# Patient Record
Sex: FEMALE | Race: BLACK OR AFRICAN AMERICAN | NOT HISPANIC OR LATINO | URBAN - METROPOLITAN AREA
[De-identification: names, ages, dates, MRNs, and addresses within clinical notes are randomized per-mention and may not be internally consistent; named-entity substitution may affect disease eponyms.]

---

## 2018-10-03 ENCOUNTER — EMERGENCY (EMERGENCY)
Facility: HOSPITAL | Age: 23
LOS: 1 days | Discharge: HOME | End: 2018-10-03
Attending: EMERGENCY MEDICINE | Admitting: EMERGENCY MEDICINE
Payer: SELF-PAY

## 2018-10-03 VITALS
SYSTOLIC BLOOD PRESSURE: 109 MMHG | WEIGHT: 199.96 LBS | DIASTOLIC BLOOD PRESSURE: 65 MMHG | HEIGHT: 66 IN | TEMPERATURE: 98 F | HEART RATE: 77 BPM | RESPIRATION RATE: 16 BRPM | OXYGEN SATURATION: 99 %

## 2018-10-03 PROBLEM — Z00.00 ENCOUNTER FOR PREVENTIVE HEALTH EXAMINATION: Status: ACTIVE | Noted: 2018-10-03

## 2018-10-03 PROCEDURE — 99283 EMERGENCY DEPT VISIT LOW MDM: CPT

## 2018-10-03 PROCEDURE — 99283 EMERGENCY DEPT VISIT LOW MDM: CPT | Mod: 25

## 2018-10-03 RX ORDER — ACETAMINOPHEN 500 MG
650 TABLET ORAL ONCE
Qty: 0 | Refills: 0 | Status: COMPLETED | OUTPATIENT
Start: 2018-10-03 | End: 2018-10-03

## 2018-10-03 RX ORDER — LIDOCAINE 4 G/100G
1 CREAM TOPICAL ONCE
Qty: 0 | Refills: 0 | Status: COMPLETED | OUTPATIENT
Start: 2018-10-03 | End: 2018-10-03

## 2018-10-03 RX ORDER — IBUPROFEN 200 MG
400 TABLET ORAL ONCE
Qty: 0 | Refills: 0 | Status: COMPLETED | OUTPATIENT
Start: 2018-10-03 | End: 2018-10-03

## 2018-10-03 RX ADMIN — LIDOCAINE 1 PATCH: 4 CREAM TOPICAL at 13:37

## 2018-10-03 RX ADMIN — Medication 400 MILLIGRAM(S): at 13:37

## 2018-10-03 RX ADMIN — Medication 650 MILLIGRAM(S): at 13:37

## 2018-10-03 NOTE — ED ADULT NURSE NOTE - NSIMPLEMENTINTERV_GEN_ALL_ED
Implemented All Universal Safety Interventions:  [place] to call system. Call bell, personal items and telephone within reach. Instruct patient to call for assistance. Room bathroom lighting operational. Non-slip footwear when patient is off stretcher. Physically safe environment: no spills, clutter or unnecessary equipment. Stretcher in lowest position, wheels locked, appropriate side rails in place.
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
## 2018-10-03 NOTE — ED PROVIDER NOTE - NS ED ROS FT
Review of Systems: No fever, no chest pain, no shortness of breath, no loss of consciousness. + R shoulder pain. ~ Orion Ames MD

## 2018-10-03 NOTE — ED PROVIDER NOTE - PHYSICAL EXAMINATION
Physical Exam: young F who is in NAD, AAOx3, cooperative with examination, non-labored breathing, normal heart rate, normal peripheral perfusion, No focal motor or sensory deficits.  MSK: No C-T spine TTP to midline. No bony TTP to the clavicle, AC joint, proximal humerus. + TTP to the R trap, scalenes, and deltoid. Normal radial pulses bilaterally, 5/5 strengh, normal sensation in median/ulnar/radial distribution. Negative Spurlings, desiree, meghan, necharlene. Normal ROM of the shoulder without discomfort. ~ Orion Ames MD
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
## 2024-09-11 ENCOUNTER — NON-APPOINTMENT (OUTPATIENT)
Age: 29
End: 2024-09-11